# Patient Record
Sex: MALE | Race: WHITE | NOT HISPANIC OR LATINO | Employment: FULL TIME | URBAN - METROPOLITAN AREA
[De-identification: names, ages, dates, MRNs, and addresses within clinical notes are randomized per-mention and may not be internally consistent; named-entity substitution may affect disease eponyms.]

---

## 2019-07-12 ENCOUNTER — OFFICE VISIT (OUTPATIENT)
Dept: FAMILY MEDICINE CLINIC | Facility: CLINIC | Age: 24
End: 2019-07-12
Payer: COMMERCIAL

## 2019-07-12 ENCOUNTER — TELEPHONE (OUTPATIENT)
Dept: FAMILY MEDICINE CLINIC | Facility: CLINIC | Age: 24
End: 2019-07-12

## 2019-07-12 VITALS
TEMPERATURE: 98.3 F | HEART RATE: 71 BPM | WEIGHT: 241.5 LBS | HEIGHT: 71 IN | DIASTOLIC BLOOD PRESSURE: 80 MMHG | OXYGEN SATURATION: 98 % | BODY MASS INDEX: 33.81 KG/M2 | RESPIRATION RATE: 16 BRPM | SYSTOLIC BLOOD PRESSURE: 140 MMHG

## 2019-07-12 DIAGNOSIS — E55.9 VITAMIN D DEFICIENCY: ICD-10-CM

## 2019-07-12 DIAGNOSIS — R63.4 WEIGHT LOSS: Primary | ICD-10-CM

## 2019-07-12 DIAGNOSIS — F33.0 MILD EPISODE OF RECURRENT MAJOR DEPRESSIVE DISORDER (HCC): ICD-10-CM

## 2019-07-12 DIAGNOSIS — R21 SKIN RASH: ICD-10-CM

## 2019-07-12 DIAGNOSIS — R12 HEART BURN: ICD-10-CM

## 2019-07-12 DIAGNOSIS — R14.0 BLOATING: ICD-10-CM

## 2019-07-12 DIAGNOSIS — F41.9 ANXIETY: ICD-10-CM

## 2019-07-12 DIAGNOSIS — R53.83 TIREDNESS: ICD-10-CM

## 2019-07-12 DIAGNOSIS — Z56.6 STRESS AT WORK: ICD-10-CM

## 2019-07-12 DIAGNOSIS — R11.2 NAUSEA AND VOMITING, INTRACTABILITY OF VOMITING NOT SPECIFIED, UNSPECIFIED VOMITING TYPE: ICD-10-CM

## 2019-07-12 PROCEDURE — 99204 OFFICE O/P NEW MOD 45 MIN: CPT | Performed by: FAMILY MEDICINE

## 2019-07-12 PROCEDURE — 3008F BODY MASS INDEX DOCD: CPT | Performed by: FAMILY MEDICINE

## 2019-07-12 RX ORDER — FLUTICASONE PROPIONATE 50 MCG
1 SPRAY, SUSPENSION (ML) NASAL AS NEEDED
COMMUNITY
Start: 2019-05-11

## 2019-07-12 RX ORDER — OMEPRAZOLE 40 MG/1
40 CAPSULE, DELAYED RELEASE ORAL
Qty: 30 CAPSULE | Refills: 5 | Status: SHIPPED | OUTPATIENT
Start: 2019-07-12 | End: 2019-07-12 | Stop reason: SDUPTHER

## 2019-07-12 SDOH — HEALTH STABILITY - MENTAL HEALTH: OTHER PHYSICAL AND MENTAL STRAIN RELATED TO WORK: Z56.6

## 2019-07-12 NOTE — PROGRESS NOTES
Chief Complaint   Patient presents with    Vomiting     past 3 months wakes up throwing up    Loss of appetite    Weight Loss     lost 20 lb in 4 months    Sores     itchy on feet, legs, hands and arms    New patient with a few complaints  Had 1 episode of chest pain day before with some shortness of breath  Assessment/Plan:         Diagnoses and all orders for this visit:    Weight loss    Nausea and vomiting, intractability of vomiting not specified, unspecified vomiting type  -     CBC and Platelet; Future  -     Comprehensive metabolic panel; Future  -     US abdomen complete; Future    Skin rash  -     Ambulatory referral to Dermatology; Future    Tiredness  -     TSH, 3rd generation; Future  -     T4, free; Future  -     T3, free; Future    Stress at work    Vitamin D deficiency  -     Vitamin D 25 hydroxy; Future    Mild episode of recurrent major depressive disorder (HCC)  -     sertraline (ZOLOFT) 50 mg tablet; Take 1 tablet (50 mg total) by mouth daily Start with 1/2 tab daily for first 4 days  -     sertraline (ZOLOFT) 50 mg tablet; Take 1 tablet (50 mg total) by mouth daily Start with 1/2 tab daily for first 4 days  Anxiety  -     sertraline (ZOLOFT) 50 mg tablet; Take 1 tablet (50 mg total) by mouth daily Start with 1/2 tab daily for first 4 days  -     sertraline (ZOLOFT) 50 mg tablet; Take 1 tablet (50 mg total) by mouth daily Start with 1/2 tab daily for first 4 days  Bloating  -     omeprazole (PriLOSEC) 40 MG capsule; Take 1 capsule (40 mg total) by mouth daily before breakfast One po evening   -     omeprazole (PriLOSEC) 40 MG capsule; Take 1 capsule (40 mg total) by mouth daily before breakfast One po evening  Heart burn  -     omeprazole (PriLOSEC) 40 MG capsule; Take 1 capsule (40 mg total) by mouth daily before breakfast One po evening   -     omeprazole (PriLOSEC) 40 MG capsule; Take 1 capsule (40 mg total) by mouth daily before breakfast One po evening      Other orders  - fluticasone (FLONASE) 50 mcg/act nasal spray; 1 spray into each nostril as needed          Subjective:      Patient ID: Chandana Capone is a 27 y o  male  First visit  Referred by girl friend  C/O am vomiting past 3 months days working, not day off  Weight loss past 3 months with loss of appetite  Loss of appetite all day till evening  Also scattered inflammatory lesions fore arms and legs (sun exposed areas not covered by irina shirt and shorts  Patient noted increased stress past 8 months at work   working mostly 7 days a week with random days off  Tired a lot  Usually works 55 hours per week spread over 7 days  Gets and feels bloated, more frequent BM's, epigastric discomfort  The following portions of the patient's history were reviewed and updated as appropriate: allergies, current medications, past family history, past medical history, past social history, past surgical history and problem list     Review of Systems   Constitutional: Positive for appetite change  Tiredness   HENT: Negative  Eyes: Negative  Respiratory: Negative  Cardiovascular: Negative  Gastrointestinal:        Some heart burn, bloating, N/V or N, increased # BM's  Genitourinary: Negative  Musculoskeletal: Negative  Skin: Positive for rash  Neurological: Negative  Psychiatric/Behavioral: Negative for suicidal ideas  The patient is nervous/anxious  Little depressed  Stressed with work           Objective:      /80 (BP Location: Left arm, Patient Position: Sitting, Cuff Size: Standard)   Pulse 71   Temp 98 3 °F (36 8 °C) (Oral)   Resp 16   Ht 5' 11" (1 803 m)   Wt 110 kg (241 lb 8 oz)   SpO2 98%   BMI 33 68 kg/m²       Current Outpatient Medications:     fluticasone (FLONASE) 50 mcg/act nasal spray, 1 spray into each nostril as needed, Disp: , Rfl:     PHQ-9 Depression Screening    PHQ-9:    Frequency of the following problems over the past two weeks: Little interest or pleasure in doing things:  3 - nearly every day  Feeling down, depressed, or hopeless:  3 - nearly every day  Trouble falling or staying asleep, or sleeping too much:  3 - nearly every day  Feeling tired or having little energy:  3 - nearly every day  Poor appetite or overeating:  3 - nearly every day  Feeling bad about yourself - or that you are a failure or have let yourself or your family down:  3 - nearly every day  Trouble concentrating on things, such as reading the newspaper or watching television:  1 - several days  Moving or speaking so slowly that other people could have noticed  Or the opposite - being so fidgety or restless that you have been moving around a lot more than usual:  3 - nearly every day  Thoughts that you would be better off dead, or of hurting yourself in some way:  0 - not at all  PHQ-2 Score:  6  PHQ-9 Score:  22          Physical Exam   Constitutional: He is oriented to person, place, and time  He appears well-developed and well-nourished  HENT:   Head: Normocephalic and atraumatic  Right Ear: External ear normal    Left Ear: External ear normal    Nose: Nose normal    Mouth/Throat: Oropharynx is clear and moist    Eyes: Pupils are equal, round, and reactive to light  Conjunctivae and EOM are normal    Neck: Normal range of motion  Neck supple  Cardiovascular: Normal rate, regular rhythm, normal heart sounds and intact distal pulses  Pulmonary/Chest: Effort normal and breath sounds normal    Abdominal: Soft  Bowel sounds are normal    Neurological: He is alert and oriented to person, place, and time  He has normal reflexes  Skin: Skin is warm and dry  Rash noted  Lesions resemble bug bits on sun exposed skin - arms and legs  Psychiatric: He has a normal mood and affect   His behavior is normal  Judgment and thought content normal

## 2019-07-12 NOTE — PROGRESS NOTES
BMI Counseling: Body mass index is 33 68 kg/m²  Discussed the patient's BMI with him  The BMI is above average  BMI counseling and education was provided to the patient  Nutrition recommendations include consuming healthier snacks, moderation in carbohydrate intake and increasing intake of lean protein

## 2019-07-12 NOTE — PROGRESS NOTES
Depression Screening Follow-up Plan: Patient's depression screening was positive with a PHQ-2 score of 6  Their PHQ-9 score was 22  Patient assessed for underlying major depression  They have no active suicidal ideations  Brief counseling provided and recommend additional follow-up/re-evaluation next office visit

## 2019-07-12 NOTE — TELEPHONE ENCOUNTER
Pharmacist called and wanted to verify if patient should be taking Omeprazole 40 mg one tablet by mouth in the evening  Per Dr Silverio Do, yes patient should take 1 tablet daily

## 2019-07-22 RX ORDER — OMEPRAZOLE 40 MG/1
40 CAPSULE, DELAYED RELEASE ORAL
Qty: 30 CAPSULE | Refills: 5 | Status: SHIPPED | OUTPATIENT
Start: 2019-07-22

## 2021-04-21 ENCOUNTER — NURSE TRIAGE (OUTPATIENT)
Dept: OTHER | Facility: OTHER | Age: 26
End: 2021-04-21

## 2021-04-21 DIAGNOSIS — Z20.828 SARS-ASSOCIATED CORONAVIRUS EXPOSURE: Primary | ICD-10-CM

## 2021-04-21 NOTE — TELEPHONE ENCOUNTER
Reason for Disposition   [1] CLOSE CONTACT COVID-19 EXPOSURE within last 14 days AND [2] needs COVID-19 lab test to return to work AND [3] NO symptoms    Protocols used: CORONAVIRUS (COVID-19) EXPOSURE-ADULT-[place]

## 2021-04-21 NOTE — TELEPHONE ENCOUNTER
Regarding: COVID Asymptomatic  ----- Message from [facility] sent at 4/21/2021  3:47 PM EDT -----  " I was exposed to someone who tested positive for COVID 19"                                                                                                                                                                                                                                                                                                                                                                                                                                       1  Were you within 6 feet or less, for up to 15 minutes or more with a person that has a confirmed COVID-19 test? yes  2  What was the date of your exposure? 4/12,19 and 4/20  3  Are you experiencing any symptoms attributed to the virus?  (Assess for SOB, cough, fever, difficulty breathing) no  4  HIGH RISK: Do you have any history heart or lung conditions, weakened immune system, diabetes, Asthma, CHF, HIV, COPD, Chemo, renal failure, sickle cell, etc? unknown  5   PREGNANCY: Are you pregnant or did you recently give birth? n/a

## 2021-04-24 DIAGNOSIS — Z20.828 SARS-ASSOCIATED CORONAVIRUS EXPOSURE: ICD-10-CM

## 2021-04-24 PROCEDURE — U0005 INFEC AGEN DETEC AMPLI PROBE: HCPCS | Performed by: FAMILY MEDICINE

## 2021-04-24 PROCEDURE — U0003 INFECTIOUS AGENT DETECTION BY NUCLEIC ACID (DNA OR RNA); SEVERE ACUTE RESPIRATORY SYNDROME CORONAVIRUS 2 (SARS-COV-2) (CORONAVIRUS DISEASE [COVID-19]), AMPLIFIED PROBE TECHNIQUE, MAKING USE OF HIGH THROUGHPUT TECHNOLOGIES AS DESCRIBED BY CMS-2020-01-R: HCPCS | Performed by: FAMILY MEDICINE

## 2021-04-25 LAB — SARS-COV-2 RNA RESP QL NAA+PROBE: NEGATIVE

## 2023-07-03 ENCOUNTER — TELEPHONE (OUTPATIENT)
Dept: FAMILY MEDICINE CLINIC | Facility: CLINIC | Age: 28
End: 2023-07-03

## 2023-07-17 ENCOUNTER — OFFICE VISIT (OUTPATIENT)
Dept: PODIATRY | Facility: CLINIC | Age: 28
End: 2023-07-17
Payer: COMMERCIAL

## 2023-07-17 ENCOUNTER — HOSPITAL ENCOUNTER (OUTPATIENT)
Dept: RADIOLOGY | Facility: HOSPITAL | Age: 28
Discharge: HOME | End: 2023-07-17
Attending: PODIATRIST
Payer: COMMERCIAL

## 2023-07-17 VITALS
OXYGEN SATURATION: 97 % | HEIGHT: 71 IN | WEIGHT: 281 LBS | BODY MASS INDEX: 39.34 KG/M2 | SYSTOLIC BLOOD PRESSURE: 134 MMHG | HEART RATE: 100 BPM | DIASTOLIC BLOOD PRESSURE: 82 MMHG

## 2023-07-17 DIAGNOSIS — M76.812 ANTERIOR TIBIALIS TENDINITIS, LEFT: ICD-10-CM

## 2023-07-17 DIAGNOSIS — R52 PAIN: Primary | ICD-10-CM

## 2023-07-17 DIAGNOSIS — M67.479 GANGLION CYST OF FOOT: ICD-10-CM

## 2023-07-17 DIAGNOSIS — R52 PAIN: ICD-10-CM

## 2023-07-17 PROCEDURE — 73630 X-RAY EXAM OF FOOT: CPT

## 2023-07-17 PROCEDURE — 99203 OFFICE O/P NEW LOW 30 MIN: CPT | Performed by: PODIATRIST

## 2023-07-17 RX ORDER — FEXOFENADINE HCL 180 MG/1
180 TABLET ORAL DAILY
COMMUNITY

## 2023-07-17 NOTE — PROGRESS NOTES
[address] [place] [address] Visit  Piter Chavez 34 y.o. male MRN: [id number]  Encounter: [id number]    ASSESSMENT:    1. Possible left tibialis anterior tendonitis vs. Ganglion cyst     Diagnoses and all orders for this visit:    Pain  -     X-ray foot left 3+ views; Future    Other orders  -     fexofenadine (ALLEGRA) 180 MG tablet; Take 180 mg by mouth daily (Patient not taking: Reported on 7/17/2023)         PLAN:    • Patient was examined, evaluated, and treated with all questions and concerns addressed. • Discussed the potential differential diagnoses associated with his left foot pain. The pain appears to be soft tissue in nature and localized to the tibialis anterior tendon just distal to the ankle joint. Suspicious for ganglion cyst development vs. Tibialis anterior tendonitis. • Left foot radiographs taken and reviewed with the patient. No evidence of fracture or osseous abnormality at his clinical area of pain, but there is an area of increased density within the soft tissue just anterior and distal to the ankle joint that warrants further investigation. • MRI ordered for further investigation of the soft tissue structures at the ankle to form a more definitive diagnosis to guide appropriate treatment. • Encouraged continued use of supportive shoe gear with wide toebox. Encouraged left ankle brace to reduce increased aggravation of tibialis anterior tendon with ambulation. • Follow up in 3 weeks to review MRI results.    - Dr. Virgen Blackwell was present for entirety of patient encounter. SUBJECTIVE:    History of Present Illness     Piter Chavez is a 34 y.o. male who presents with left dorsal foot pain near the ankle joint. He denies recent trauma to the area, however, he reports a past history of a hairline fracture that healed without issue in his left foot ~3 years ago. Patient reports the pain as an "ache" and has consistently worsened over the past few months.  The pain is especially apparent when he walks long distances, but can become less painful with rest.. 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 He reports wearing supportive Hoka sneakers, and those help alleviate some of the pain. He also takes NSAIDS as needed to alleviate the pain. Patient denies N/V/F/chills/SOB/CP. Review of Systems   Constitutional: Negative. HENT: Negative. Eyes: Negative. Respiratory: Negative. Cardiovascular: Negative. Gastrointestinal: Negative. Musculoskeletal: pain in dorsal left foot  Skin: Negative  Neurological: Negative       Historical Information   History reviewed. No pertinent past medical history. History reviewed. No pertinent surgical history. Social History   Social History     Substance and Sexual Activity   Alcohol Use Not Currently     Social History     Substance and Sexual Activity   Drug Use Never     Social History     Tobacco Use   Smoking Status Light Smoker   Smokeless Tobacco Never   Tobacco Comments    occasional cigar smoker     Family History:   Family History   Problem Relation Age of Onset   • Anemia Mother    • Heart disease Father        Meds/Allergies   (Not in a hospital admission)    No Known Allergies      OBJECTIVE:    Current Vitals:   Blood Pressure: 134/82 (07/17/23 0837)  Pulse: 100 (07/17/23 0837)  Height: 5' 11" (180.3 cm) (07/17/23 0837)  Weight - Scale: 127 kg (281 lb) (07/17/23 0837)  SpO2: 97 % (07/17/23 0837)        /82 (BP Location: Right arm, Patient Position: Sitting, Cuff Size: Standard)   Pulse 100   Ht 5' 11" (1.803 m)   Wt 127 kg (281 lb)   SpO2 97%   BMI 39.19 kg/m²       Lower Extremity Exam:    Physical Exam:    Musculoskeletal:  MMT is 5/5 to all compartments B/L, Hallux ROM is < 65 degrees B/L, Ankle dorsiflexion < 10 degrees from neutral with leg extended B/L, no pain or guarding during passive joint ROM. Active ROM to the digits intact. No gross deformities noted.     Left foot: Mild pain on palpation of the tibialis anterior tendon just distal to the anterior aspect of the ankle joint. No pain on palpation of the tibialis anterior insertion point at the medial midfoot. Mild pain with resisted inversion of left foot. Cardiovascular:   Pedal pulses palpable, +1/4 edema B/L, CFT< 3 sec to digits. Pedal hair is Present. Pulse has regular rate and rhythm. Skin temperature warm to warm B/L. No calf tenderness. Dermatological:  No open Lesions. Skin of the LE is normal temperature, texture, turgor. Neurological:  AAOx3. Gross sensation is intact. Vibratory sensation Intact. Protective sensation is Intact. Biomechanical Exam of LE:  Hip ROM WNL Bilateral, external and internal rotation about equal at 45° b/l  Knee ROM WNL Bilateral, no hyperextension noted b/l, mild genu varum noted b/l  Ankle dorsiflexion with knee extended is limited but able to get past vertical on right and  limited but able to get past vertical on left  Ankle dorsiflexion with knee flexed is limited, unable to get pass vertical and able to get pass vertical on right and limited, unable to get pass vertical and able to get pass vertical on left  Malleolar positioning is WNL b/l  STJ ROM WNL b/l, heel inversion is approximately 25° on right and 25° on left; heel eversion is approximately 5° on right and 5° on left; neutral calcaneal stance position is 5°   1st ray ROM WNL b/l, able to dorsiflex/plantarflex b/l  Tibial varum is 2° b/l, Resting calcaneal stance position is valgus and approximately 5° on right and valgus and approximately 5° on left  Gait analysis: WNL  Gross deformity noted: mild pes cavus b/l      Imaging: I have personally reviewed pertinent films in PACS  EKG, Pathology, and Other Studies: I have personally reviewed pertinent reports. ** Please Note: Portions of the record may have been created with voice recognition software.  Occasional wrong word or "sound a like" substitutions may have occurred due to the inherent limitations of voice recognition software. Read the chart carefully and recognize, using context, where substitutions have occurred.  **

## 2023-07-29 ENCOUNTER — HOSPITAL ENCOUNTER (OUTPATIENT)
Dept: MRI IMAGING | Facility: HOSPITAL | Age: 28
Discharge: HOME | End: 2023-07-29
Attending: PODIATRIST
Payer: COMMERCIAL

## 2023-07-29 DIAGNOSIS — M67.479 GANGLION CYST OF FOOT: ICD-10-CM

## 2023-07-29 DIAGNOSIS — R52 PAIN: ICD-10-CM

## 2023-07-29 DIAGNOSIS — M76.812 ANTERIOR TIBIALIS TENDINITIS, LEFT: ICD-10-CM

## 2023-07-29 PROCEDURE — 73718 MRI LOWER EXTREMITY W/O DYE: CPT

## 2023-07-29 PROCEDURE — G1004 CDSM NDSC: HCPCS

## 2023-08-04 ENCOUNTER — OFFICE VISIT (OUTPATIENT)
Dept: PODIATRY | Facility: CLINIC | Age: 28
End: 2023-08-04
Payer: COMMERCIAL

## 2023-08-04 VITALS
HEIGHT: 71 IN | SYSTOLIC BLOOD PRESSURE: 124 MMHG | WEIGHT: 280 LBS | HEART RATE: 108 BPM | OXYGEN SATURATION: 97 % | DIASTOLIC BLOOD PRESSURE: 84 MMHG | BODY MASS INDEX: 39.2 KG/M2

## 2023-08-04 DIAGNOSIS — M79.672 LEFT FOOT PAIN: ICD-10-CM

## 2023-08-04 DIAGNOSIS — M67.472 GANGLION CYST OF LEFT FOOT: Primary | ICD-10-CM

## 2023-08-04 PROCEDURE — 99213 OFFICE O/P EST LOW 20 MIN: CPT | Performed by: PODIATRIST

## 2023-08-04 PROCEDURE — 10060 I&D ABSCESS SIMPLE/SINGLE: CPT | Performed by: PODIATRIST

## 2023-08-04 NOTE — PROGRESS NOTES
Assessment/Plan:    The patient's MRI report of the left ankle was appreciated. Findings were significant for a ganglion cyst noted within the anterior tibialis tendon. A smaller cyst was also noted to the dorsal lateral aspect of the talonavicular joint. 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 Images were personally reviewed and discussed with the patient. On clinical examination, there is a palpable cystic lesion along the left tibialis anterior tendon to the dorsal aspect of the left midfoot to the anterior ankle. There is no erythema nor edema nor calor nor ecchymosis. Range of motion and muscle power is within normal limits the left foot and ankle. Treatment options were discussed with the patient to include benign neglect, aspiration of the lesion, or surgical excision. The patient does note discomfort emanating from the cyst and would like to have it aspirated today. After a local filtration with 2 mL of 0.25% Vivacaine plain, an 18-gauge needle was utilized to aspirate the cystic lesion. Approximately 2 mL of a clear viscous material consistent with a ganglion cyst was aspirated. There was no atypia noted. Post aspiration, a steroid injection was administered in ganglion cyst consisting of 1 mL of Kenalog 40. A sterile Band-Aid was applied and a compression wrap was applied to the left foot and ankle utilizing Coflex. The patient did have a vasovagal reaction after local filtration that was unwitnessed. The patient notes that he woke up next to his chair and apparently struck the side of his face. There is a small contusion on his left cheekbone. He denies any other current symptomatology. He defers transfer to the local ED for further evaluation. He did make a phone call to his wife who has come to pick him up. Pupils were equal round and reactive to light and accommodation.   He was feeling well at the conclusion of his visit. Recommend follow-up in 4 to 6 weeks to assess the ganglion cyst status post aspiration. No problem-specific Assessment & Plan notes found for this encounter. Diagnoses and all orders for this visit:    Ganglion cyst of left foot    Left foot pain    Other orders  -     Incision and Drainage          Subjective:      Patient ID: Brandi White is a 34 y.o. male. The patient presents today for follow-up of a painful ganglion cyst to his anterior left ankle. He presents for review of his recent MRI study. There is mild to moderate tenderness along the ganglion cyst in the area of the tibialis anterior tendon that is exacerbated with prolonged periods of weightbearing and ambulation and in close toed shoe gear. The following portions of the patient's history were reviewed and updated as appropriate: allergies, current medications, past family history, past medical history, past social history, past surgical history and problem list.      PAST MEDICAL HISTORY:  History reviewed. No pertinent past medical history. PAST SURGICAL HISTORY:  History reviewed. No pertinent surgical history. ALLERGIES:  Patient has no known allergies. MEDICATIONS:  Current Outpatient Medications   Medication Sig Dispense Refill   • fexofenadine (ALLEGRA) 180 MG tablet Take 180 mg by mouth daily     • fluticasone (FLONASE) 50 mcg/act nasal spray 1 spray into each nostril as needed     • omeprazole (PriLOSEC) 40 MG capsule Take 1 capsule (40 mg total) by mouth daily before breakfast One po evening. (Patient not taking: Reported on 7/17/2023) 30 capsule 5   • sertraline (ZOLOFT) 50 mg tablet Take 1 tablet (50 mg total) by mouth daily Start with 1/2 tab daily for first 4 days. (Patient not taking: Reported on 7/17/2023) 30 tablet 2     No current facility-administered medications for this visit.        SOCIAL HISTORY:  Social History     Socioeconomic History   • Marital status: /Civil Union     Spouse name: None   • Number of children: None   • Years of education: None   • Highest education level: None   Occupational History   • None   Tobacco Use   • Smoking status: Light Smoker   • Smokeless tobacco: Never   • Tobacco comments:     occasional cigar smoker   Substance and Sexual Activity   • Alcohol use: Not Currently   • Drug use: Never   • Sexual activity: None   Other Topics Concern   • None   Social History Narrative   • None     Social Determinants of Health     Financial Resource Strain: Not on file   Food Insecurity: Not on file   Transportation Needs: Not on file   Physical Activity: Not on file   Stress: Not on file   Social Connections: Not on file   Intimate Partner Violence: Not on file   Housing Stability: Not on file        Review of Systems   Constitutional: Negative. Negative for chills and fever. HENT: Negative. Negative for ear pain and sore throat. Eyes: Negative. Negative for pain and visual disturbance. Respiratory: Negative. Negative for cough and shortness of breath. Cardiovascular: Negative. Negative for chest pain and palpitations. Gastrointestinal: Negative for abdominal pain and vomiting. Endocrine: Negative. Genitourinary: Negative for dysuria and hematuria. Musculoskeletal: Negative. Negative for arthralgias and back pain. Skin: Negative for color change and rash. Neurological: Negative. Negative for seizures and syncope. Hematological: Negative. Psychiatric/Behavioral: Negative. All other systems reviewed and are negative. Objective:      /84 (BP Location: Left arm, Patient Position: Sitting, Cuff Size: Standard)   Pulse (!) 108   Ht 5' 11" (1.803 m)   Wt 127 kg (280 lb)   SpO2 97%   BMI 39.05 kg/m²          Physical Exam  Vitals reviewed. Constitutional:       Appearance: Normal appearance. HENT:      Head: Normocephalic and atraumatic.       Nose: Nose normal.   Eyes:      Conjunctiva/sclera: Conjunctivae normal. Pupils: Pupils are equal, round, and reactive to light. Cardiovascular:      Pulses:           Dorsalis pedis pulses are 2+ on the left side. Posterior tibial pulses are 2+ on the left side. Pulmonary:      Effort: Pulmonary effort is normal.   Musculoskeletal:        Feet:    Feet:      Left foot:      Skin integrity: Skin integrity normal.      Comments: On clinical examination, there is a palpable cystic lesion along the left tibialis anterior tendon to the dorsal aspect of the left midfoot to the anterior ankle. There is no erythema nor edema nor calor nor ecchymosis. Range of motion and muscle power is within normal limits the left foot and ankle. Skin:     General: Skin is warm. Capillary Refill: Capillary refill takes less than 2 seconds. Neurological:      General: No focal deficit present. Mental Status: He is alert and oriented to person, place, and time. Psychiatric:         Mood and Affect: Mood normal.         Behavior: Behavior normal.         Thought Content: Thought content normal.         Incision and Drainage    Date/Time: 8/4/2023 10:30 AM    Performed by: Sugey Menendez DPM  Authorized by: Sugey Menendez DPM  Universal Protocol:  Consent: Verbal consent obtained. Risks and benefits: risks, benefits and alternatives were discussed  Consent given by: patient  Time out: Immediately prior to procedure a "time out" was called to verify the correct patient, procedure, equipment, support staff and site/side marked as required.   Timeout called at: 8/4/2023 10:30 AM.  Patient understanding: patient states understanding of the procedure being performed  Patient consent: the patient's understanding of the procedure matches consent given  Patient identity confirmed: verbally with patient and provided demographic data      Patient location:  Clinic  Location:     Type:  Cyst    Size:   2.2 x 0.7 x 2.1 cm    Location:  Lower extremity    Lower extremity location:  L foot  Pre-procedure details:     Skin preparation:  Betadine  Anesthesia (see MAR for exact dosages): Anesthesia method:  Local infiltration    Local anesthetic:  Bupivacaine 0.25% w/o epi  Procedure details:     Complexity:  Simple    Needle aspiration: yes      Needle size:  18 G    Incision depth:  Subcutaneous    Wound management:  Probed and deloculated    Drainage characteristics: Clear of viscous mucoid material.    Drainage amount: Moderate  Post-procedure details:     Patient tolerance of procedure: Tolerated well, no immediate complications    Complication (if applicable): The patient did have a basal vagal response during numbing with local filtration with bupivacaine, he felt well enough to proceed with the procedure as planned. Comments:      After local filtration to the area of the ganglion cyst with 2 mL of 0.25% again plain, the skin was prepped with Betadine. Aspiration of the ganglion cyst in the area of the tibialis anterior was then carried out with introduction of an sterile 18-gauge needle and 10 cc syringe. Approximately 2 mL of a clear viscous material was aspirated consistent with a ganglion cyst.  There is no atypia noted. The patient tolerated the procedure well. A sterile Band-Aid was applied over the aspiration site after injection of the cystic lesion with 1 mL of Kenalog 40. A compressive dressing was also applied over the left foot and ankle and is to be maintained for the remainder of the day.

## 2024-02-20 ENCOUNTER — TELEPHONE (OUTPATIENT)
Dept: FAMILY MEDICINE CLINIC | Facility: CLINIC | Age: 29
End: 2024-02-20